# Patient Record
Sex: MALE | Race: WHITE | NOT HISPANIC OR LATINO | Employment: FULL TIME | URBAN - METROPOLITAN AREA
[De-identification: names, ages, dates, MRNs, and addresses within clinical notes are randomized per-mention and may not be internally consistent; named-entity substitution may affect disease eponyms.]

---

## 2017-11-17 ENCOUNTER — TELEPHONE (OUTPATIENT)
Dept: OPTOMETRY | Facility: CLINIC | Age: 37
End: 2017-11-17

## 2018-01-30 ENCOUNTER — OFFICE VISIT (OUTPATIENT)
Dept: OPTOMETRY | Facility: CLINIC | Age: 38
End: 2018-01-30
Payer: COMMERCIAL

## 2018-01-30 DIAGNOSIS — H52.11 MYOPIA OF RIGHT EYE: ICD-10-CM

## 2018-01-30 DIAGNOSIS — Z46.0 ENCOUNTER FOR FITTING OR ADJUSTMENT OF SPECTACLES OR CONTACT LENSES: Primary | ICD-10-CM

## 2018-01-30 DIAGNOSIS — Z01.00 EXAMINATION OF EYES AND VISION: Primary | ICD-10-CM

## 2018-01-30 DIAGNOSIS — H52.202 MYOPIA OF LEFT EYE WITH ASTIGMATISM: ICD-10-CM

## 2018-01-30 DIAGNOSIS — H52.12 MYOPIA OF LEFT EYE WITH ASTIGMATISM: ICD-10-CM

## 2018-01-30 PROCEDURE — 99999 PR PBB SHADOW E&M-EST. PATIENT-LVL II: CPT | Mod: PBBFAC,,, | Performed by: OPTOMETRIST

## 2018-01-30 PROCEDURE — 92310 CONTACT LENS FITTING OU: CPT | Mod: S$GLB,,, | Performed by: OPTOMETRIST

## 2018-01-30 PROCEDURE — 99499 UNLISTED E&M SERVICE: CPT | Mod: S$GLB,,, | Performed by: OPTOMETRIST

## 2018-01-30 PROCEDURE — 92015 DETERMINE REFRACTIVE STATE: CPT | Mod: S$GLB,,, | Performed by: OPTOMETRIST

## 2018-01-30 PROCEDURE — 92014 COMPRE OPH EXAM EST PT 1/>: CPT | Mod: S$GLB,,, | Performed by: OPTOMETRIST

## 2018-01-30 NOTE — PATIENT INSTRUCTIONS
Good ocular health in both eyes.   Myopia in the right eye and myopia with astigmatism in the left eye.  Good best-corrected VA in each eye.  New spectacle lens Rx issued for use in lieu of CLs.    Good CL fit with present SCLs (spherical lens in the right eye, and toric lens in the left eye).  Wearing CLs well in both eyes.  CL power fine as is in each lens.    New (duplicate) CL Rx issued.    Recheck in 12 months - or prior if any problems noted in the interim.

## 2018-01-30 NOTE — PROGRESS NOTES
HPI     eye exam    Additional comments: General eye exam and refraction, and CL follow up.  Reports no problems with CLs.  Good comfort and good VA in each eye.            Comments   Patient's age: 37 y.o. WM  Approximate date of last eye examination:  02/25/2016  Name of last eye doctor seen: Dr. Ac  Wears glasses? Yes - uses only when without CLs     If yes, wears  Full-time or part-time?  Part-time  Present glasses are: Bifocal, SV Distance, SV Reading?  Single vision   Approximate age of present glasses:  Two years   Got new glasses following last exam, or subsequently?:  States yes   Any problem with VA with glasses?  no  Wears CLs?:  yes           If yes:              Type of CL worn:  OD  Acuvue Oasys   8.4   14.0   -4.50                                            OS Biofinity Toric      8.7     14.5   -4.50 -0.75 x 140              Wears full-time or part-time:  Full-time daily wear               Sleeps with contact lenses:  no                             How often replace CLs:  monthly              Any problem with VA with CLs?  No               Headaches?  no  Eye pain/discomfort?  no                                                                                     Flashes?  no  Floaters?  No   Diplopia/Double vision?  no  Patient's Ocular History:         Any eye surgeries? no         Any eye injury?  no         Any treatment for eye disease?  no  Family history of eye disease?  no  Significant patient medical history:         1. Diabetes?  no       If yes, IDDM or NIDDM? n/a   2. HBP?  no              3. Other (describe):  no   ! OTC eyedrops currently using:  no   ! Prescription eye meds currently using:  no   ! Any history of allergy/adverse reaction to any eye meds used   previously?  no    ! Any history of allergy/adverse reaction to eyedrops used during prior   eye exam(s)? no       ! Any history of allergy/adverse reaction to Epinephrine or similar meds?   no    ! Patient okay with use of  anesthetic eyedrops to check eye pressure?    yes        ! Patient okay with use of eyedrops to dilate pupils today?  yes   !  Allergies/Medications/Medical History/Family History reviewed today?    yes      PD =   63/59                                                                       Last edited by Amos Ac, OD on 1/30/2018  1:16 PM. (History)            Assessment /Plan     For exam results, see Encounter Report.    1. Examination of eyes and vision     2. Myopia of right eye     3. Myopia of left eye with astigmatism                    Good ocular health in both eyes.   Myopia in the right eye and myopia with astigmatism in the left eye.  Good best-corrected VA in each eye.  New spectacle lens Rx issued for use in lieu of CLs.    Good CL fit with present SCLs (spherical lens in the right eye, and toric lens in the left eye).  Wearing CLs well in both eyes.  CL power fine as is in each lens.    New (duplicate) CL Rx issued.    Recheck in 12 months - or prior if any problems noted in the interim.

## 2018-01-30 NOTE — PROGRESS NOTES
HPI     Patient in today for contact lens follow-up with general eye examination.    Refer to additional patient encounter notes dated 01/30/3018.      Last edited by Amos Ac, OD on 1/30/2018 12:51 PM. (History)            Assessment /Plan     For exam results, see Encounter Report.    1. Encounter for fitting or adjustment of spectacles or contact lenses                    Good ocular health in both eyes.   Myopia in the right eye and myopia with astigmatism in the left eye.  Good best-corrected VA in each eye.  New spectacle lens Rx issued for use in lieu of CLs.    Good CL fit with present SCLs (spherical lens in the right eye, and toric lens in the left eye).  Wearing CLs well in both eyes.  CL power fine as is in each lens.    New (duplicate) CL Rx issued.    Recheck in 12 months - or prior if any problems noted in the interim.

## 2019-10-07 ENCOUNTER — OFFICE VISIT (OUTPATIENT)
Dept: OPTOMETRY | Facility: CLINIC | Age: 39
End: 2019-10-07
Payer: COMMERCIAL

## 2019-10-07 ENCOUNTER — OFFICE VISIT (OUTPATIENT)
Dept: OPTOMETRY | Facility: CLINIC | Age: 39
End: 2019-10-07

## 2019-10-07 DIAGNOSIS — Z01.00 EXAMINATION OF EYES AND VISION: Primary | ICD-10-CM

## 2019-10-07 DIAGNOSIS — H52.12 MYOPIA OF LEFT EYE WITH ASTIGMATISM: ICD-10-CM

## 2019-10-07 DIAGNOSIS — Z46.0 ENCOUNTER FOR FITTING OR ADJUSTMENT OF SPECTACLES OR CONTACT LENSES: Primary | ICD-10-CM

## 2019-10-07 DIAGNOSIS — H52.11 MYOPIA OF RIGHT EYE: ICD-10-CM

## 2019-10-07 DIAGNOSIS — H52.202 MYOPIA OF LEFT EYE WITH ASTIGMATISM: ICD-10-CM

## 2019-10-07 PROCEDURE — 99499 UNLISTED E&M SERVICE: CPT | Mod: S$GLB,,, | Performed by: OPTOMETRIST

## 2019-10-07 PROCEDURE — 92014 COMPRE OPH EXAM EST PT 1/>: CPT | Mod: S$GLB,,, | Performed by: OPTOMETRIST

## 2019-10-07 PROCEDURE — 92310 CONTACT LENS FITTING OU: CPT | Mod: CSM,,, | Performed by: OPTOMETRIST

## 2019-10-07 PROCEDURE — 92015 PR REFRACTION: ICD-10-PCS | Mod: S$GLB,,, | Performed by: OPTOMETRIST

## 2019-10-07 PROCEDURE — 99999 PR PBB SHADOW E&M-EST. PATIENT-LVL II: CPT | Mod: PBBFAC,,, | Performed by: OPTOMETRIST

## 2019-10-07 PROCEDURE — 92014 PR EYE EXAM, EST PATIENT,COMPREHESV: ICD-10-PCS | Mod: S$GLB,,, | Performed by: OPTOMETRIST

## 2019-10-07 PROCEDURE — 92015 DETERMINE REFRACTIVE STATE: CPT | Mod: S$GLB,,, | Performed by: OPTOMETRIST

## 2019-10-07 PROCEDURE — 99999 PR PBB SHADOW E&M-EST. PATIENT-LVL II: ICD-10-PCS | Mod: PBBFAC,,, | Performed by: OPTOMETRIST

## 2019-10-07 PROCEDURE — 99499 NO LOS: ICD-10-PCS | Mod: S$GLB,,, | Performed by: OPTOMETRIST

## 2019-10-07 PROCEDURE — 92310 PR CONTACT LENS FITTING (NO CHANGE): ICD-10-PCS | Mod: CSM,,, | Performed by: OPTOMETRIST

## 2019-10-07 NOTE — PATIENT INSTRUCTIONS
Apparently good ocular health in both eyes, although dilated fundus exam not done today per request.  Mr. Oconnell will return at later date for dilated fundus exam.  Myopia in the right eye and myopia with astigmatism in the left eye.  Stable refraction in each eye.   Good best-corrected VA in each eye.  New spectacle lens Rx issued for use in lieu of CLs.     Good CL fit with present SCLs (spherical lens in the right eye, and toric lens in the left eye).  Wearing CLs well in both eyes.  CL power fine as is in each lens.     New (duplicate) CL Rx issued.     Recheck in 12 months - or prior if any problems noted in the interim.

## 2019-10-07 NOTE — PROGRESS NOTES
HPI     eye exam      Additional comments: Annual general eye exam and refraction and contact   lens follow-up.  No acute ocular/vision problems.    CLs doing well.  Problem with OS about a week ago- eye was red. Stopped CL wear for approx   one wee, and problem resolved.              Comments     Patient's age: 39y.o. 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 Ac   Wears glasses? Yes - uses only when without CLs -did not bring glasses   today      If yes, wears  Full-time or part-time?  Part-time   Present glasses are: Bifocal, SV Distance, SV Reading?  Single vision   Approximate age of present glasses:  three  years   Got new glasses following last exam, or subsequently?:  no   Any problem with VA with glasses?  no   Wears CLs?:  yes            If yes:               Type of CL worn:  OD  Acuvue Oasys   8.4   14.0   -4.50                                             OS Biofinity Toric      8.7     14.5   -4.50 -0.75 x 140               Wears full-time or part-time:  Full-time daily wear                Sleeps with contact lenses:  no                             How often replace CLs:  monthly               Any problem with VA with CLs?  Not currently - see notes above                    Headaches?  yes, periodically - takes ibuprofen - no pattern to headaches.     Eye pain/discomfort?  no                                                                                     Flashes?  no   Floaters?  No   Diplopia/Double vision?  no   Patient's Ocular History:          Any eye surgeries? no          Any eye injury?  no          Any treatment for eye disease?  no   Family history of eye disease?  no   Significant patient medical history:         1. Diabetes?  no      If yes, IDDM or NIDDM? n/a   2. HBP?  no               3. Other (describe):  no    ! OTC eyedrops currently using:  no    ! Prescription eye meds currently using:  no    ! Any  history of allergy/adverse reaction to any eye meds used   previously?  no    ! Any history of allergy/adverse reaction to eyedrops used during prior   eye exam(s)? no        ! Any history of allergy/adverse reaction to Epinephrine or similar meds?   no    ! Patient okay with use of anesthetic eyedrops to check eye pressure?    yes        ! Patient okay with use of eyedrops to dilate pupils today?  yes    !  Allergies/Medications/Medical History/Family History reviewed today?    yes       PD =   63/59                               Last edited by Amos Ac, OD on 10/7/2019  7:36 AM. (History)            Assessment /Plan     For exam results, see Encounter Report.    1. Examination of eyes and vision     2. Myopia of right eye     3. Myopia of left eye with astigmatism                      Apparently good ocular health in both eyes, although dilated fundus exam not done today per request.  Mr. Oconnell will return at later date for dilated fundus exam.  Myopia in the right eye and myopia with astigmatism in the left eye.  Stable refraction in each eye.   Good best-corrected VA in each eye.  New spectacle lens Rx issued for use in lieu of CLs.     Good CL fit with present SCLs (spherical lens in the right eye, and toric lens in the left eye).  Wearing CLs well in both eyes.  CL power fine as is in each lens.     New (duplicate) CL Rx issued.     Recheck in 12 months - or prior if any problems noted in the interim.

## 2019-10-07 NOTE — PROGRESS NOTES
HPI     Contact Lens Follow Up      Additional comments: Patient in today for contact lens follow-up with   general eye examination.  Refer to additional patient encounter notes   dated 10/07/2019.                Comments     Patient in today for contact lens follow-up with general eye examination.    Refer to additional patient encounter notes dated 10/07/2019.            Last edited by Amos Ac, OD on 10/7/2019 11:46 AM. (History)            Assessment /Plan     For exam results, see Encounter Report.    1. Encounter for fitting or adjustment of spectacles or contact lenses                    Apparently good ocular health in both eyes, although dilated fundus exam not done today per request.  Mr. Oconnell will return at later date for dilated fundus exam.  Myopia in the right eye and myopia with astigmatism in the left eye.  Stable refraction in each eye.   Good best-corrected VA in each eye.  New spectacle lens Rx issued for use in lieu of CLs.     Good CL fit with present SCLs (spherical lens in the right eye, and toric lens in the left eye).  Wearing CLs well in both eyes.  CL power fine as is in each lens.     New (duplicate) CL Rx issued.     Recheck in 12 months - or prior if any problems noted in the interim.

## 2020-04-21 DIAGNOSIS — Z01.84 ANTIBODY RESPONSE EXAMINATION: ICD-10-CM

## 2020-04-22 ENCOUNTER — LAB VISIT (OUTPATIENT)
Dept: LAB | Facility: HOSPITAL | Age: 40
End: 2020-04-22
Attending: INTERNAL MEDICINE
Payer: COMMERCIAL

## 2020-04-22 DIAGNOSIS — Z01.84 ANTIBODY RESPONSE EXAMINATION: ICD-10-CM

## 2020-04-22 LAB — SARS-COV-2 IGG SERPL QL IA: NEGATIVE

## 2020-04-22 PROCEDURE — 86769 SARS-COV-2 COVID-19 ANTIBODY: CPT

## 2020-04-22 PROCEDURE — 36415 COLL VENOUS BLD VENIPUNCTURE: CPT

## 2020-05-07 DIAGNOSIS — M54.9 BACK PAIN, UNSPECIFIED BACK LOCATION, UNSPECIFIED BACK PAIN LATERALITY, UNSPECIFIED CHRONICITY: Primary | ICD-10-CM

## 2020-05-07 RX ORDER — METHYLPREDNISOLONE 4 MG/1
TABLET ORAL
Qty: 1 PACKAGE | Refills: 0 | Status: SHIPPED | OUTPATIENT
Start: 2020-05-07 | End: 2020-05-28

## 2020-05-28 ENCOUNTER — TELEPHONE (OUTPATIENT)
Dept: DERMATOLOGY | Facility: CLINIC | Age: 40
End: 2020-05-28

## 2020-05-28 NOTE — TELEPHONE ENCOUNTER
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 He works at [employer].

## 2020-06-04 ENCOUNTER — OFFICE VISIT (OUTPATIENT)
Dept: DERMATOLOGY | Facility: CLINIC | Age: 40
End: 2020-06-04
Payer: COMMERCIAL

## 2020-06-04 DIAGNOSIS — B36.0 PITYRIASIS VERSICOLOR: Primary | ICD-10-CM

## 2020-06-04 PROCEDURE — 99202 PR OFFICE/OUTPT VISIT, NEW, LEVL II, 15-29 MIN: ICD-10-PCS | Mod: 95,,, | Performed by: DERMATOLOGY

## 2020-06-04 PROCEDURE — 99202 OFFICE O/P NEW SF 15 MIN: CPT | Mod: 95,,, | Performed by: DERMATOLOGY

## 2020-06-04 RX ORDER — KETOCONAZOLE 20 MG/ML
SHAMPOO, SUSPENSION TOPICAL
Qty: 120 ML | Refills: 5 | Status: SHIPPED | OUTPATIENT
Start: 2020-06-04 | End: 2023-06-09

## 2020-06-04 RX ORDER — FLUCONAZOLE 200 MG/1
TABLET ORAL
Qty: 4 TABLET | Refills: 3 | Status: SHIPPED | OUTPATIENT
Start: 2020-06-04 | End: 2023-06-09

## 2020-06-04 NOTE — PROGRESS NOTES
Subjective:       Patient ID:  Nick Oconnell is a 39 y.o. male who presents for   Chief Complaint   Patient presents with    Spot     upper chest             Rash  - Initial  Affected locations: right shoulder, right arm, abdomen, chest and back  Duration: 1 month  Signs / symptoms: itching  Severity: mild to moderate  Aggravated by: heat and sweating  Relieving factors/Treatments tried: OTC antifungals  Improvement on treatment: no relief        Review of Systems   Skin: Positive for itching and rash.        Objective:    Physical Exam   Constitutional: He appears well-developed and well-nourished. No distress.   Eyes: Lids are normal.  No conjunctival no injection.   Neurological: He is oriented to person, place, and time. He is not disoriented.   Psychiatric: He has a normal mood and affect.   Skin:   Areas Examined (abnormalities noted in diagram):   Scalp / Hair Palpated and Inspected  Head / Face Inspection Performed  Chest / Axilla Inspection Performed  Back Inspection Performed  RUE Inspected  LUE Inspection Performed              Diagram Legend     Erythematous scaling macule/papule c/w actinic keratosis       Vascular papule c/w angioma      Pigmented verrucoid papule/plaque c/w seborrheic keratosis      Yellow umbilicated papule c/w sebaceous hyperplasia      Irregularly shaped tan macule c/w lentigo     1-2 mm smooth white papules consistent with Milia      Movable subcutaneous cyst with punctum c/w epidermal inclusion cyst      Subcutaneous movable cyst c/w pilar cyst      Firm pink to brown papule c/w dermatofibroma      Pedunculated fleshy papule(s) c/w skin tag(s)      Evenly pigmented macule c/w junctional nevus     Mildly variegated pigmented, slightly irregular-bordered macule c/w mildly atypical nevus      Flesh colored to evenly pigmented papule c/w intradermal nevus       Pink pearly papule/plaque c/w basal cell carcinoma      Erythematous hyperkeratotic cursted plaque c/w SCC       Surgical scar with no sign of skin cancer recurrence      Open and closed comedones      Inflammatory papules and pustules      Verrucoid papule consistent consistent with wart     Erythematous eczematous patches and plaques     Dystrophic onycholytic nail with subungual debris c/w onychomycosis     Umbilicated papule    Erythematous-base heme-crusted tan verrucoid plaque consistent with inflamed seborrheic keratosis     Erythematous Silvery Scaling Plaque c/w Psoriasis     See annotation                  Assessment / Plan:        Pityriasis versicolor  -     fluconazole (DIFLUCAN) 200 MG Tab; 1 po biw x 2 weeks when flaring  Dispense: 4 tablet; Refill: 3  -     ketoconazole (NIZORAL) 2 % shampoo; Wash hair with medicated shampoo at least 2x/week - let sit on scalp at least 5 minutes prior to rinsing  Dispense: 120 mL; Refill: 5    Use ketoconazole shampoo as body wash x 1 month, then continue to use at least twice weekly let sit 5 minutes then rinse  Okay to get refills for 1 year   Patient to message me in one month if not clearing will schedule sooner follow up visit  Info given to patient in AVS    F/u 1 year    The patient location is: Home in [place]  The chief complaint leading to consultation is: Tinea Veriscolor    Visit type: audiovisual    Face to Face time with patient: 10  15 minutes of total time spent on the encounter, which includes face to face time and non-face to face time preparing to see the patient (eg, review of tests), Obtaining and/or reviewing separately obtained history, Documenting clinical information in the electronic or other health record, Independently interpreting results (not separately reported) and communicating results to the patient/family/caregiver, or Care coordination (not separately reported).         Each patient to whom he or she provides medical services by telemedicine is:  (1) informed of the relationship between the physician and patient and the respective role of  any other health care provider with respect to management of the patient; and (2) notified that he or she may decline to receive medical services by telemedicine and may withdraw from such care at any time.           No follow-ups on file.

## 2020-06-04 NOTE — PATIENT INSTRUCTIONS
Use ketoconazole shampoo as body wash x 1 month, then continue to use at least twice weekly let sit 5 minutes then rinse  Okay to get refills for 1 year   Patient to message me in one month if not clearing will schedule sooner follow up visit    PITYRIASIS VERSICOLOR Patient information    What are the aims of this leaflet?  This leaflet has been written to help you understand more about pityriasis versicolor.  It tells you what it is, what causes it, what can be done about it, and where you can  find out more about it.    What is pityriasis versicolor?  Pityriasis means a type of fine skin scaling, and versicolor means changing colour. It  is a common and harmless rash due to the overgrowth of yeasts that live on everyones  skin. These yeasts, called Malassezia, are not related to yeast in food or to those that  cause thrush. It is also sometimes called tinea versicolor.    What causes it?  Large numbers of tiny harmless organisms, known as the resident geronimo, live on the  surface of everyones skin. Some of them are yeasts. At times, these yeasts can  overgrow and trigger the rash known as pityriasis versicolor. This happens most often  in warm moist climates. Most people with this condition are in good health.  The rash has a mixture of colours- hence the name versicolor. It looks pale brown or  pink on untanned, white skin, but sunlight will cause the yeasts to make chemicals that  prevent tanning, so the rash stays paler than the surrounding skin. For this reason, the  rash is often noticed for the first time after exposure to the sun such as following a  ursula holiday.  The condition is most common in people in their early 20s. People who get it may also  have dandruff which is caused by a similar yeast overgrowth on the scalp. We do not  understand why some people tend to get it and others do not.    Is it hereditary?  No.    What are its symptoms?  The rash can be mildly itchy but usually causes no  trouble apart from its appearance.  It often goes unnoticed if only a few patches are present.    What does it look like?  The rash usually affects the torso but can also affect the upper arms, neck and  stomach. Flat, slightly scaly areas of altered colour appear more obvious against a  background of unaffected skin. The colour of the patches may vary from pale to dark  brown. On white skin the affected areas usually appear darker than surrounding skin  and in dark skinned people the affected patches can appear pale.    \How will it be diagnosed?  The diagnosis is usually made by your doctor looking at the rash. A special ultra-violet  lamp, known as Woods lamp, can be used to look for yellow fluorescence which is  typical of pityriasis versicolor. Sometimes your doctor may take skin scrapings to  confirm the diagnosis. If there is any doubt, very occasionally a skin (biopsy) [this is  when a small sample of skin is cut out to look at under the microscope] may be  considered.    Can it be cured?  Yes, the rash clears with treatment although the pale areas will take several months  to return to their normal colour. Importantly, this does not mean treatment has failed.  The rash often recurs as the yeasts that cause it to live on normal skin and cannot be  eradicated completely. Pityriasis versicolor does not leave scars.    How can it be treated?  Treatments applied to the skin:  Most patients are treated with topical antifungal agents that are applied to the skin.  Treatments that reduce the amount of skin yeasts include terbinafine cream,  clotrimazole cream and miconazole cream. Ketoconazole and selenium sulphide  shampoos can be used as body washes but should not be left on the skin.  Medicines taken by mouth:  These include itraconazole and fluconazole, which can be prescribed by your doctor.  They are effective but can have side effects so are usually prescribed for widespread  rashes, or when topical  treatment has failed.    Recurrences:  The rash of pityriasis versicolor often comes back. Occasional use of an anti-dandruff  shampoo as a bodywash may reduce the chance of this happening.       What can I do?  It may help if you wash with an anti-dandruff shampoo for a few weeks before you go  on a ursula holiday to reduce the level of skin yeasts. There is no evidence that the  complaint is related to yeast in food so there is no need to change your diet.  Where can I get more information about it?  Web links to detailed leaflets:  http://www.dermnetnz.org/fungal/pityriasis-versicolor.html  http://www.patient.co.uk/doctor/pityriasis-versicolor  For details of source materials used please contact the Clinical Standards Unit  (clinicalstandards@bad.org.uk).  This leaflet aims to provide accurate information about the subject and is a  consensus of the views held by representatives of the [employer]  [employer]: individual patient circumstances may differ, which might alter  both the advice and course of therapy given to you by your doctor.  This leaflet has been assessed for readability by the [employer]  [employer] Patient Information Lay Review Panel  [place] [employer]  PATIENT INFORMATION LEAFLET  PRODUCED MAY 2008  UPDATED AUGUST 2011, AUGUST 2014, NOVEMBER 2017  REVIEW DATE NOVEMBER 2020

## 2020-10-02 ENCOUNTER — CLINICAL SUPPORT (OUTPATIENT)
Dept: OTHER | Facility: CLINIC | Age: 40
End: 2020-10-02
Payer: COMMERCIAL

## 2020-10-02 DIAGNOSIS — Z00.8 ENCOUNTER FOR OTHER GENERAL EXAMINATION: ICD-10-CM

## 2020-10-06 VITALS — BODY MASS INDEX: 27.05 KG/M2 | HEIGHT: 73 IN

## 2020-10-06 LAB
GLUCOSE SERPL-MCNC: 95 MG/DL (ref 60–140)
HDLC SERPL-MCNC: 41 MG/DL
POC CHOLESTEROL, LDL (DOCK): 119 MG/DL
POC CHOLESTEROL, TOTAL: 186 MG/DL
TRIGL SERPL-MCNC: 131 MG/DL

## 2020-10-07 ENCOUNTER — OFFICE VISIT (OUTPATIENT)
Dept: OPTOMETRY | Facility: CLINIC | Age: 40
End: 2020-10-07
Payer: COMMERCIAL

## 2020-10-07 DIAGNOSIS — Z46.0 ENCOUNTER FOR FITTING OR ADJUSTMENT OF SPECTACLES OR CONTACT LENSES: Primary | ICD-10-CM

## 2020-10-07 DIAGNOSIS — H52.11 MYOPIA OF RIGHT EYE: ICD-10-CM

## 2020-10-07 DIAGNOSIS — H52.202 MYOPIA OF LEFT EYE WITH ASTIGMATISM: ICD-10-CM

## 2020-10-07 DIAGNOSIS — H52.12 MYOPIA OF LEFT EYE WITH ASTIGMATISM: ICD-10-CM

## 2020-10-07 DIAGNOSIS — Z01.00 EXAMINATION OF EYES AND VISION: Primary | ICD-10-CM

## 2020-10-07 PROCEDURE — 92012 INTRM OPH EXAM EST PATIENT: CPT | Mod: S$GLB,,, | Performed by: OPTOMETRIST

## 2020-10-07 PROCEDURE — 99499 NO LOS: ICD-10-PCS | Mod: S$GLB,,, | Performed by: OPTOMETRIST

## 2020-10-07 PROCEDURE — 92310 CONTACT LENS FITTING OU: CPT | Mod: CSM,S$GLB,, | Performed by: OPTOMETRIST

## 2020-10-07 PROCEDURE — 92310 PR CONTACT LENS FITTING (NO CHANGE): ICD-10-PCS | Mod: CSM,S$GLB,, | Performed by: OPTOMETRIST

## 2020-10-07 PROCEDURE — 99499 UNLISTED E&M SERVICE: CPT | Mod: S$GLB,,, | Performed by: OPTOMETRIST

## 2020-10-07 PROCEDURE — 92015 DETERMINE REFRACTIVE STATE: CPT | Mod: S$GLB,,, | Performed by: OPTOMETRIST

## 2020-10-07 PROCEDURE — 99999 PR PBB SHADOW E&M-EST. PATIENT-LVL III: ICD-10-PCS | Mod: PBBFAC,,, | Performed by: OPTOMETRIST

## 2020-10-07 PROCEDURE — 92012 PR EYE EXAM, EST PATIENT,INTERMED: ICD-10-PCS | Mod: S$GLB,,, | Performed by: OPTOMETRIST

## 2020-10-07 PROCEDURE — 99999 PR PBB SHADOW E&M-EST. PATIENT-LVL III: CPT | Mod: PBBFAC,,, | Performed by: OPTOMETRIST

## 2020-10-07 PROCEDURE — 92015 PR REFRACTION: ICD-10-PCS | Mod: S$GLB,,, | Performed by: OPTOMETRIST

## 2020-10-07 NOTE — PATIENT INSTRUCTIONS
Apparently good ocular health in both eyes, although dilated fundus exam not done today per request.  Mr. Oconnell will return at later date for dilated fundus exam.  Myopia in the right eye and myopia with astigmatism in the left eye.    Good best-corrected VA in each eye.  New spectacle lens Rx issued for use in lieu of CLs.     Good CL fit with present SCLs (spherical lens in the right eye, and toric lens in the left eye).  Wearing CLs well in both eyes.  CL power in right lens fine as is, and showint need for only minimal change in the power of the left lens     New CL Rx issued.    As noted above, return when convenient for dilated fundus exam.  Call Ms. Rasheed at [phone] to schedule that no-charge visit      Recheck in 12 months - or prior if any problems noted in the interim.

## 2020-10-07 NOTE — PROGRESS NOTES
HPI     Contact Lens Follow Up      Additional comments: Patient in today for contact lens follow-up with   general eye examination.  Refer to additional patient encounter notes   dated 10/07/2020.                Comments     Patient in today for contact lens follow-up with general eye examination.    Refer to additional patient encounter notes dated 10/07/2020.            Last edited by Amos Ac, OD on 10/7/2020  8:18 AM. (History)            Assessment /Plan     For exam results, see Encounter Report.    1. Encounter for fitting or adjustment of spectacles or contact lenses                    Apparently good ocular health in both eyes, although dilated fundus exam not done today per request.  Mr. Oconnell will return at later date for dilated fundus exam.  Myopia in the right eye and myopia with astigmatism in the left eye.    Good best-corrected VA in each eye.  New spectacle lens Rx issued for use in lieu of CLs.     Good CL fit with present SCLs (spherical lens in the right eye, and toric lens in the left eye).  Wearing CLs well in both eyes.  CL power in right lens fine as is, and showint need for only minimal change in the power of the left lens     New CL Rx issued.    As noted above, return when convenient for dilated fundus exam.  Call Ms. Rasheed at [phone] to schedule that no-charge visit      Recheck in 12 months - or prior if any problems noted in the interim.

## 2020-10-07 NOTE — PROGRESS NOTES
HPI     eye examination       Additional comments: General eye exam and refraction and contact lens   follow-up.  No acute ocular/vision problems.  No problems with CLs.  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               Comments     Patient's age: 40 y.o.    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 Ac   Wears glasses? Yes - uses only when without CLs      If yes, wears  Full-time or part-time?  Part-time   Present glasses are: Bifocal, SV Distance, SV Reading?  Single vision   Approximate age of present glasses:  one or two years old  Got new glasses following last exam, or subsequently?:  unsure.   Any problem with VA with glasses?  no   Wears CLs?:  yes            If yes:               Type of CL worn:  OD  Acuvue Oasys   8.4   14.0   -4.50 sph                                            OS Biofinity Toric      8.7     14.5   -4.50 -0.75 x 140               Wears full-time or part-time:  Full-time daily wear                Sleeps with contact lenses:  no                             How often replace CLs:  R lens every two weeks, L lens monthly                 Any problem with VA with CLs?  No                  Headaches?  no - not since he started Claritin and Flonase    Eye pain/discomfort?  no                                                                                     Flashes?  no   Floaters?  No   Diplopia/Double vision?  no   Patient's Ocular History:          Any eye surgeries? no          Any eye injury?  no          Any treatment for eye disease?  no   Family history of eye disease?  no   Significant patient medical history:         1. Diabetes?  no      If yes, IDDM or NIDDM? n/a             2. HBP?  no                 ! OTC eyedrops currently using:  no    ! Prescription eye meds currently using:  no    ! Any history of allergy/adverse reaction to any eye meds used   previously?   no    ! Any history of allergy/adverse reaction to eyedrops used during prior   eye exam(s)? no        ! Any history of allergy/adverse reaction to Epinephrine or similar meds?   no    ! Patient okay with use of anesthetic eyedrops to check eye pressure?    yes        ! Patient okay with use of eyedrops to dilate pupils today?  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  Allergies/Medications/Medical History/Family History reviewed today?    yes     PD =   63/59           Last edited by Amos Ac, OD on 10/7/2020  7:37 AM. (History)            Assessment /Plan     For exam results, see Encounter Report.    1. Examination of eyes and vision     2. Myopia of right eye     3. Myopia of left eye with astigmatism                    Apparently good ocular health in both eyes, although dilated fundus exam not done today per request.  Mr. Oconnell will return at later date for dilated fundus exam.  Myopia in the right eye and myopia with astigmatism in the left eye.    Good best-corrected VA in each eye.  New spectacle lens Rx issued for use in lieu of CLs.     Good CL fit with present SCLs (spherical lens in the right eye, and toric lens in the left eye).  Wearing CLs well in both eyes.  CL power in right lens fine as is, and showint need for only minimal change in the power of the left lens     New CL Rx issued.    As noted above, return when convenient for dilated fundus exam.  Call Ms. Rasheed at [phone] to schedule that no-charge visit      Recheck in 12 months - or prior if any problems noted in the interim.

## 2020-10-07 NOTE — PATIENT INSTRUCTIONS
Apparently good ocular health in both eyes, although dilated fundus exam not done today per request.  Mr. Oconnell will return at later date for dilated fundus exam.  Myopia in the right eye and myopia with astigmatism in the left eye.    Good best-corrected VA in each eye.  New spectacle lens Rx issued for use in lieu of CLs.     Good CL fit with present SCLs (spherical lens in the right eye, and toric lens in the left eye).  Wearing CLs well in both eyes.  CL power in right lens fine as is, and showint need for only minimal change in the power of the left lens     New CL Rx issued.    As noted above, return when convenient for dilated fundus exam.  Call Ms. Rasheed at [phone] to schedule that no-charge visit      Recheck in 12 months - or prior if any problems noted in the interim.

## 2020-12-17 ENCOUNTER — IMMUNIZATION (OUTPATIENT)
Dept: INTERNAL MEDICINE | Facility: CLINIC | Age: 40
End: 2020-12-17
Payer: COMMERCIAL

## 2020-12-17 DIAGNOSIS — Z23 NEED FOR VACCINATION: ICD-10-CM

## 2020-12-17 PROCEDURE — 91300 COVID-19, MRNA, LNP-S, PF, 30 MCG/0.3 ML DOSE VACCINE: CPT | Mod: ,,, | Performed by: INTERNAL MEDICINE

## 2020-12-17 PROCEDURE — 0001A COVID-19, MRNA, LNP-S, PF, 30 MCG/0.3 ML DOSE VACCINE: CPT | Mod: CV19,,, | Performed by: INTERNAL MEDICINE

## 2020-12-17 PROCEDURE — 0001A COVID-19, MRNA, LNP-S, PF, 30 MCG/0.3 ML DOSE VACCINE: ICD-10-PCS | Mod: CV19,,, | Performed by: INTERNAL MEDICINE

## 2020-12-17 PROCEDURE — 91300 COVID-19, MRNA, LNP-S, PF, 30 MCG/0.3 ML DOSE VACCINE: ICD-10-PCS | Mod: ,,, | Performed by: INTERNAL MEDICINE

## 2021-01-07 ENCOUNTER — IMMUNIZATION (OUTPATIENT)
Dept: INTERNAL MEDICINE | Facility: CLINIC | Age: 41
End: 2021-01-07
Payer: COMMERCIAL

## 2021-01-07 DIAGNOSIS — Z23 NEED FOR VACCINATION: ICD-10-CM

## 2021-01-07 PROCEDURE — 91300 COVID-19, MRNA, LNP-S, PF, 30 MCG/0.3 ML DOSE VACCINE: CPT | Mod: PBBFAC | Performed by: INTERNAL MEDICINE

## 2021-01-07 PROCEDURE — 0002A COVID-19, MRNA, LNP-S, PF, 30 MCG/0.3 ML DOSE VACCINE: CPT | Mod: PBBFAC | Performed by: INTERNAL MEDICINE

## 2021-04-09 ENCOUNTER — OFFICE VISIT (OUTPATIENT)
Dept: OTOLARYNGOLOGY | Facility: CLINIC | Age: 41
End: 2021-04-09
Payer: COMMERCIAL

## 2021-04-09 VITALS
DIASTOLIC BLOOD PRESSURE: 82 MMHG | BODY MASS INDEX: 27.05 KG/M2 | SYSTOLIC BLOOD PRESSURE: 149 MMHG | WEIGHT: 205 LBS | HEART RATE: 69 BPM

## 2021-04-09 DIAGNOSIS — Z96.22 HISTORY OF TYMPANOSTOMY TUBE PLACEMENT: ICD-10-CM

## 2021-04-09 DIAGNOSIS — H69.93 DYSFUNCTION OF BOTH EUSTACHIAN TUBES: Primary | ICD-10-CM

## 2021-04-09 PROCEDURE — 99999 PR PBB SHADOW E&M-EST. PATIENT-LVL III: ICD-10-PCS | Mod: PBBFAC,,, | Performed by: OTOLARYNGOLOGY

## 2021-04-09 PROCEDURE — 3008F PR BODY MASS INDEX (BMI) DOCUMENTED: ICD-10-PCS | Mod: CPTII,S$GLB,, | Performed by: OTOLARYNGOLOGY

## 2021-04-09 PROCEDURE — 99203 PR OFFICE/OUTPT VISIT, NEW, LEVL III, 30-44 MIN: ICD-10-PCS | Mod: S$GLB,,, | Performed by: OTOLARYNGOLOGY

## 2021-04-09 PROCEDURE — 3008F BODY MASS INDEX DOCD: CPT | Mod: CPTII,S$GLB,, | Performed by: OTOLARYNGOLOGY

## 2021-04-09 PROCEDURE — 99999 PR PBB SHADOW E&M-EST. PATIENT-LVL III: CPT | Mod: PBBFAC,,, | Performed by: OTOLARYNGOLOGY

## 2021-04-09 PROCEDURE — 99203 OFFICE O/P NEW LOW 30 MIN: CPT | Mod: S$GLB,,, | Performed by: OTOLARYNGOLOGY

## 2021-04-09 RX ORDER — FLUTICASONE PROPIONATE 50 MCG
1 SPRAY, SUSPENSION (ML) NASAL DAILY
COMMUNITY

## 2021-04-09 RX ORDER — LORATADINE 10 MG/1
10 TABLET ORAL DAILY
COMMUNITY

## 2021-11-10 ENCOUNTER — TELEPHONE (OUTPATIENT)
Dept: OPHTHALMOLOGY | Facility: CLINIC | Age: 41
End: 2021-11-10
Payer: COMMERCIAL

## 2021-11-15 ENCOUNTER — TELEPHONE (OUTPATIENT)
Dept: OPHTHALMOLOGY | Facility: CLINIC | Age: 41
End: 2021-11-15
Payer: COMMERCIAL

## 2021-12-01 ENCOUNTER — OFFICE VISIT (OUTPATIENT)
Dept: OPTOMETRY | Facility: CLINIC | Age: 41
End: 2021-12-01
Payer: COMMERCIAL

## 2021-12-01 DIAGNOSIS — Z01.00 EXAMINATION OF EYES AND VISION: Primary | ICD-10-CM

## 2021-12-01 DIAGNOSIS — Z46.0 ENCOUNTER FOR FITTING OR ADJUSTMENT OF SPECTACLES OR CONTACT LENSES: Primary | ICD-10-CM

## 2021-12-01 DIAGNOSIS — Z13.5 SCREENING FOR EYE CONDITION: ICD-10-CM

## 2021-12-01 DIAGNOSIS — H52.203 MYOPIA OF BOTH EYES WITH ASTIGMATISM: ICD-10-CM

## 2021-12-01 DIAGNOSIS — H52.13 MYOPIA OF BOTH EYES WITH ASTIGMATISM: ICD-10-CM

## 2021-12-01 PROCEDURE — 92310 PR CONTACT LENS FITTING (NO CHANGE): ICD-10-PCS | Mod: ,,, | Performed by: OPTOMETRIST

## 2021-12-01 PROCEDURE — 99999 PR PBB SHADOW E&M-EST. PATIENT-LVL II: CPT | Mod: PBBFAC,,, | Performed by: OPTOMETRIST

## 2021-12-01 PROCEDURE — 92310 CONTACT LENS FITTING OU: CPT | Mod: ,,, | Performed by: OPTOMETRIST

## 2021-12-01 PROCEDURE — 92015 PR REFRACTION: ICD-10-PCS | Mod: S$GLB,,, | Performed by: OPTOMETRIST

## 2021-12-01 PROCEDURE — 99499 UNLISTED E&M SERVICE: CPT | Mod: ,,, | Performed by: OPTOMETRIST

## 2021-12-01 PROCEDURE — 92014 PR EYE EXAM, EST PATIENT,COMPREHESV: ICD-10-PCS | Mod: S$GLB,,, | Performed by: OPTOMETRIST

## 2021-12-01 PROCEDURE — 92015 DETERMINE REFRACTIVE STATE: CPT | Mod: S$GLB,,, | Performed by: OPTOMETRIST

## 2021-12-01 PROCEDURE — 99499 NO LOS: ICD-10-PCS | Mod: ,,, | Performed by: OPTOMETRIST

## 2021-12-01 PROCEDURE — 99999 PR PBB SHADOW E&M-EST. PATIENT-LVL II: ICD-10-PCS | Mod: PBBFAC,,, | Performed by: OPTOMETRIST

## 2021-12-01 PROCEDURE — 92014 COMPRE OPH EXAM EST PT 1/>: CPT | Mod: S$GLB,,, | Performed by: OPTOMETRIST

## 2022-01-07 ENCOUNTER — DOCUMENTATION ONLY (OUTPATIENT)
Dept: ADMINISTRATIVE | Facility: HOSPITAL | Age: 42
End: 2022-01-07
Payer: COMMERCIAL

## 2022-01-07 LAB
CTP QC/QA: YES
SARS-COV-2 AG RESP QL IA.RAPID: NEGATIVE

## 2022-08-04 ENCOUNTER — PATIENT MESSAGE (OUTPATIENT)
Dept: OPTOMETRY | Facility: CLINIC | Age: 42
End: 2022-08-04
Payer: COMMERCIAL

## 2022-12-07 ENCOUNTER — PATIENT MESSAGE (OUTPATIENT)
Dept: OPTOMETRY | Facility: CLINIC | Age: 42
End: 2022-12-07
Payer: COMMERCIAL

## 2022-12-08 ENCOUNTER — DOCUMENTATION ONLY (OUTPATIENT)
Dept: OPHTHALMOLOGY | Facility: CLINIC | Age: 42
End: 2022-12-08
Payer: COMMERCIAL

## 2022-12-08 ENCOUNTER — TELEPHONE (OUTPATIENT)
Dept: OPHTHALMOLOGY | Facility: CLINIC | Age: 42
End: 2022-12-08
Payer: COMMERCIAL

## 2022-12-08 NOTE — PROGRESS NOTES
Assessment /Plan     For exam results, see Encounter Report.    There are no diagnoses linked to this encounter.  Refractive error OU.  Wears SCLs OU           Contact lens Rx extended to 06/01/2023.    Amos Ac, OD

## 2022-12-19 ENCOUNTER — TELEPHONE (OUTPATIENT)
Dept: OPHTHALMOLOGY | Facility: CLINIC | Age: 42
End: 2022-12-19
Payer: COMMERCIAL

## 2022-12-19 NOTE — TELEPHONE ENCOUNTER
----- Message from Jennifer Chin sent at 12/19/2022  2:35 PM CST -----  This patient picked up his trials today.    Jennifer

## 2022-12-20 ENCOUNTER — TELEPHONE (OUTPATIENT)
Dept: OPHTHALMOLOGY | Facility: CLINIC | Age: 42
End: 2022-12-20
Payer: COMMERCIAL

## 2023-03-22 ENCOUNTER — OFFICE VISIT (OUTPATIENT)
Dept: OPTOMETRY | Facility: CLINIC | Age: 43
End: 2023-03-22
Payer: COMMERCIAL

## 2023-03-22 DIAGNOSIS — Z01.00 EXAMINATION OF EYES AND VISION: Primary | ICD-10-CM

## 2023-03-22 DIAGNOSIS — Z46.0 ENCOUNTER FOR FITTING OR ADJUSTMENT OF SPECTACLES OR CONTACT LENSES: Primary | ICD-10-CM

## 2023-03-22 DIAGNOSIS — H52.203 MYOPIA OF BOTH EYES WITH ASTIGMATISM: ICD-10-CM

## 2023-03-22 DIAGNOSIS — Z13.5 SCREENING FOR EYE CONDITION: ICD-10-CM

## 2023-03-22 DIAGNOSIS — H52.13 MYOPIA OF BOTH EYES WITH ASTIGMATISM: ICD-10-CM

## 2023-03-22 PROCEDURE — 99999 PR PBB SHADOW E&M-EST. PATIENT-LVL III: ICD-10-PCS | Mod: PBBFAC,,, | Performed by: OPTOMETRIST

## 2023-03-22 PROCEDURE — 92015 DETERMINE REFRACTIVE STATE: CPT | Mod: S$GLB,,, | Performed by: OPTOMETRIST

## 2023-03-22 PROCEDURE — 92012 PR EYE EXAM, EST PATIENT,INTERMED: ICD-10-PCS | Mod: S$GLB,,, | Performed by: OPTOMETRIST

## 2023-03-22 PROCEDURE — 99499 UNLISTED E&M SERVICE: CPT | Mod: ,,, | Performed by: OPTOMETRIST

## 2023-03-22 PROCEDURE — 92015 PR REFRACTION: ICD-10-PCS | Mod: S$GLB,,, | Performed by: OPTOMETRIST

## 2023-03-22 PROCEDURE — 99499 NO LOS: ICD-10-PCS | Mod: ,,, | Performed by: OPTOMETRIST

## 2023-03-22 PROCEDURE — 92310 CONTACT LENS FITTING OU: CPT | Mod: CSM,S$GLB,, | Performed by: OPTOMETRIST

## 2023-03-22 PROCEDURE — 92310 PR CONTACT LENS FITTING (NO CHANGE): ICD-10-PCS | Mod: CSM,S$GLB,, | Performed by: OPTOMETRIST

## 2023-03-22 PROCEDURE — 99999 PR PBB SHADOW E&M-EST. PATIENT-LVL III: CPT | Mod: PBBFAC,,, | Performed by: OPTOMETRIST

## 2023-03-22 PROCEDURE — 92012 INTRM OPH EXAM EST PATIENT: CPT | Mod: S$GLB,,, | Performed by: OPTOMETRIST

## 2023-03-22 NOTE — PATIENT INSTRUCTIONS
Apparently good ocular health in both eyes, but dilated fundus exam not done today per request, and tonometry deferred until Mr. Oconnell can return for dilated fundus exam at later date.      Myopia with astigmatism in the each eye, per post-contact lens refraction done today.  Good best-corrected VA in each eye.  New spectacle lens Rx issued for use in lieu of CLs.     Good CL fit with present SCLs (Acuvue spherical lens in the right eye, and Biofinity toric lens in the left eye).  Wearing CLs well in both eyes.  CL power in right lens fine as is, and showing need for only minimal change in the power of the left lens     New CL Rx issued.     Return when convenient for dilated fundus exam and tonometry.   Otherwise, repeat general eye exam and contact lens check in 12 months - or prior if any problems noted in the interim.

## 2023-03-22 NOTE — PROGRESS NOTES
HPI     Contact Lens Follow Up            Comments: Patient in today for contact lens follow-up with general eye   examination.  Refer to additional patient encounter notes dated   03/22/2023.              Comments    Patient in today for contact lens follow-up with general eye examination.    Refer to additional patient encounter notes dated 03/22/2023.              Last edited by Amos Ac, OD on 3/22/2023  1:18 PM.            Assessment /Plan     For exam results, see Encounter Report.    1. Encounter for fitting or adjustment of spectacles or contact lenses                     Apparently good ocular health in both eyes, but dilated fundus exam not done today per request, and tonometry deferred until Mr. Oconnell can return for dilated fundus exam at later date.      Myopia with astigmatism in the each eye, per post-contact lens refraction done today.  Good best-corrected VA in each eye.  New spectacle lens Rx issued for use in lieu of CLs.     Good CL fit with present SCLs (Acuvue spherical lens in the right eye, and Biofinity toric lens in the left eye).  Wearing CLs well in both eyes.  CL power in right lens fine as is, and showing need for only minimal change in the power of the left lens     New CL Rx issued.     Return when convenient for dilated fundus exam and tonometry.   Otherwise, repeat general eye exam and contact lens check in 12 months - or prior if any problems noted in the interim.

## 2023-03-22 NOTE — PROGRESS NOTES
HPI     eye examination             Comments: Eye exam and refraction and contact lens follow-up.  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 Ac   Wears glasses? Yes - uses only when without CLs      If yes, wears  Full-time or part-time?  Part-time   Present glasses are: Bifocal, SV Distance, SV Reading?  Single vision   Approximate age of present glasses: 6 years  Got new glasses following last exam, or subsequently?:  no   Any problem with VA with glasses?  no   Wears CLs?:  yes            If yes:               Type of CL worn:  OD  Acuvue Oasys   8.4   14.0   -4.25 sph                                             OS Biofinity Toric      8.7     14.5   -4.25 -0.75 x 140                       Wears full-time or part-time:  Full-time daily wear                Sleeps with contact lenses:  no                             How often replace CLs:  monthly              Any problem with VA with CLs?  No                 Headaches?  no  Eye pain/discomfort?  no                                                                                     Flashes?  no   Floaters?  No   Diplopia/Double vision?  no   Patient's Ocular History:          Any eye surgeries? no          Any eye injury?  no          Any treatment for eye disease?  no   Family history of eye disease?  no   Significant patient medical history:         1. Diabetes?  no      If yes, IDDM or NIDDM? n/a             2. HBP?  no               3.  Allergy problems    ! OTC eyedrops currently using:  no    ! Prescription eye meds currently using:  no    ! Any history of allergy/adverse reaction to any eye meds used   previously?  no    ! Any history of  "allergy/adverse reaction to eyedrops used during prior   eye exam(s)? no        ! Any history of allergy/adverse reaction to Epinephrine or similar meds?   no     ! Patient okay with use of anesthetic eyedrops to check eye pressure?    yes        ! 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  Allergies/Medications/Medical History/Family History reviewed today?    yes     PD =   63/59   Reading distance = 17"            Last edited by Amos Ac, OD on 3/22/2023  1:35 PM.            Assessment /Plan     For exam results, see Encounter Report.    1. Examination of eyes and vision        2. Myopia of both eyes with astigmatism        3. Screening for eye condition                       Apparently good ocular health in both eyes, but dilated fundus exam not done today per request, and tonometry deferred until Mr. Oconnell can return for dilated fundus exam at later date.      Myopia with astigmatism in the each eye, per post-contact lens refraction done today.  Good best-corrected VA in each eye.  New spectacle lens Rx issued for use in lieu of CLs.     Good CL fit with present SCLs (Acuvue spherical lens in the right eye, and Biofinity toric lens in the left eye).  Wearing CLs well in both eyes.  CL power in right lens fine as is, and showing need for only minimal change in the power of the left lens     New CL Rx issued.     Return when convenient for dilated fundus exam and tonometry.   Otherwise, repeat general eye exam and contact lens check in 12 months - or prior if any problems noted in the interim.          "

## 2023-03-31 ENCOUNTER — PATIENT MESSAGE (OUTPATIENT)
Dept: OPTOMETRY | Facility: CLINIC | Age: 43
End: 2023-03-31
Payer: COMMERCIAL

## 2023-06-09 ENCOUNTER — OFFICE VISIT (OUTPATIENT)
Dept: PRIMARY CARE CLINIC | Facility: CLINIC | Age: 43
End: 2023-06-09
Payer: COMMERCIAL

## 2023-06-09 ENCOUNTER — LAB VISIT (OUTPATIENT)
Dept: LAB | Facility: HOSPITAL | Age: 43
End: 2023-06-09
Payer: COMMERCIAL

## 2023-06-09 VITALS
OXYGEN SATURATION: 99 % | TEMPERATURE: 98 F | HEART RATE: 70 BPM | BODY MASS INDEX: 26.85 KG/M2 | DIASTOLIC BLOOD PRESSURE: 72 MMHG | RESPIRATION RATE: 18 BRPM | HEIGHT: 73 IN | SYSTOLIC BLOOD PRESSURE: 132 MMHG | WEIGHT: 202.63 LBS

## 2023-06-09 DIAGNOSIS — Z00.00 ENCOUNTER FOR PHYSICAL EXAMINATION: Primary | ICD-10-CM

## 2023-06-09 DIAGNOSIS — Z00.00 ENCOUNTER FOR PHYSICAL EXAMINATION: ICD-10-CM

## 2023-06-09 LAB
ALBUMIN SERPL BCP-MCNC: 4.5 G/DL (ref 3.5–5.2)
ALP SERPL-CCNC: 81 U/L (ref 55–135)
ALT SERPL W/O P-5'-P-CCNC: 25 U/L (ref 10–44)
ANION GAP SERPL CALC-SCNC: 6 MMOL/L (ref 8–16)
AST SERPL-CCNC: 29 U/L (ref 10–40)
BILIRUB SERPL-MCNC: 0.4 MG/DL (ref 0.1–1)
BILIRUB UR QL STRIP: NEGATIVE
BUN SERPL-MCNC: 21 MG/DL (ref 6–20)
CALCIUM SERPL-MCNC: 9.8 MG/DL (ref 8.7–10.5)
CHLORIDE SERPL-SCNC: 98 MMOL/L (ref 95–110)
CHOLEST SERPL-MCNC: 173 MG/DL (ref 120–199)
CHOLEST/HDLC SERPL: 3.6 {RATIO} (ref 2–5)
CLARITY UR REFRACT.AUTO: CLEAR
CO2 SERPL-SCNC: 32 MMOL/L (ref 23–29)
COLOR UR AUTO: COLORLESS
COMPLEXED PSA SERPL-MCNC: 0.23 NG/ML (ref 0–4)
CREAT SERPL-MCNC: 1.1 MG/DL (ref 0.5–1.4)
ERYTHROCYTE [DISTWIDTH] IN BLOOD BY AUTOMATED COUNT: 12.1 % (ref 11.5–14.5)
EST. GFR  (NO RACE VARIABLE): >60 ML/MIN/1.73 M^2
GLUCOSE SERPL-MCNC: 127 MG/DL (ref 70–110)
GLUCOSE UR QL STRIP: NEGATIVE
HCT VFR BLD AUTO: 45.3 % (ref 40–54)
HDLC SERPL-MCNC: 48 MG/DL (ref 40–75)
HDLC SERPL: 27.7 % (ref 20–50)
HGB BLD-MCNC: 14.7 G/DL (ref 14–18)
HGB UR QL STRIP: NEGATIVE
KETONES UR QL STRIP: NEGATIVE
LDLC SERPL CALC-MCNC: 100.8 MG/DL (ref 63–159)
LEUKOCYTE ESTERASE UR QL STRIP: NEGATIVE
MAGNESIUM SERPL-MCNC: 1.8 MG/DL (ref 1.6–2.6)
MCH RBC QN AUTO: 29.6 PG (ref 27–31)
MCHC RBC AUTO-ENTMCNC: 32.5 G/DL (ref 32–36)
MCV RBC AUTO: 91 FL (ref 82–98)
NITRITE UR QL STRIP: NEGATIVE
NONHDLC SERPL-MCNC: 125 MG/DL
PH UR STRIP: 6 [PH] (ref 5–8)
PLATELET # BLD AUTO: 232 K/UL (ref 150–450)
PMV BLD AUTO: 11.8 FL (ref 9.2–12.9)
POTASSIUM SERPL-SCNC: 4.5 MMOL/L (ref 3.5–5.1)
PROT SERPL-MCNC: 7.5 G/DL (ref 6–8.4)
PROT UR QL STRIP: NEGATIVE
RBC # BLD AUTO: 4.97 M/UL (ref 4.6–6.2)
SODIUM SERPL-SCNC: 136 MMOL/L (ref 136–145)
SP GR UR STRIP: 1.01 (ref 1–1.03)
TRIGL SERPL-MCNC: 121 MG/DL (ref 30–150)
URN SPEC COLLECT METH UR: ABNORMAL
WBC # BLD AUTO: 6.99 K/UL (ref 3.9–12.7)

## 2023-06-09 PROCEDURE — 85027 COMPLETE CBC AUTOMATED: CPT | Performed by: INTERNAL MEDICINE

## 2023-06-09 PROCEDURE — 80061 LIPID PANEL: CPT | Performed by: INTERNAL MEDICINE

## 2023-06-09 PROCEDURE — 99999 PR PBB SHADOW E&M-EST. PATIENT-LVL IV: CPT | Mod: PBBFAC,,, | Performed by: INTERNAL MEDICINE

## 2023-06-09 PROCEDURE — 36415 COLL VENOUS BLD VENIPUNCTURE: CPT | Performed by: INTERNAL MEDICINE

## 2023-06-09 PROCEDURE — 99999 PR PBB SHADOW E&M-EST. PATIENT-LVL IV: ICD-10-PCS | Mod: PBBFAC,,, | Performed by: INTERNAL MEDICINE

## 2023-06-09 PROCEDURE — 81003 URINALYSIS AUTO W/O SCOPE: CPT | Performed by: INTERNAL MEDICINE

## 2023-06-09 PROCEDURE — 99499 UNLISTED E&M SERVICE: CPT | Mod: S$GLB,,, | Performed by: INTERNAL MEDICINE

## 2023-06-09 PROCEDURE — 84153 ASSAY OF PSA TOTAL: CPT | Performed by: INTERNAL MEDICINE

## 2023-06-09 PROCEDURE — 83735 ASSAY OF MAGNESIUM: CPT | Performed by: INTERNAL MEDICINE

## 2023-06-09 PROCEDURE — 99499 NO LOS: ICD-10-PCS | Mod: S$GLB,,, | Performed by: INTERNAL MEDICINE

## 2023-06-09 PROCEDURE — 80053 COMPREHEN METABOLIC PANEL: CPT | Performed by: INTERNAL MEDICINE

## 2023-06-09 NOTE — PROGRESS NOTES
42-year-old gentleman with a history of rhinitis (on Claritin daily and Flonase daily depending on the season) here for physical exam as well as questions regarding aging and testosterone    Pertinent review of systems:  Encounter to establish in for physical exam:  The patient has had no physical exam in the last 5 years.  The exam was made because a friend was talking about fatigue and was found to have low testosterone.  The patient pursues and lives a healthy lifestyle however it is filled with multiple stresses.  Patient works as an ICU nurse in the cardiac unit and typically will not only work his shift but other shifts as well.  He has 4 children any plays in coaches sports.  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  He notes this has created some financial stress.  In general he follows a good diet rotating proteins in carbohydrates.  He exercises on a regular near daily basis.  Recently he has had issues with sleep but he also notes he consumes a large amount of caffeine.    Questions about testosterone:  As noted above the patient at spoke to 1 of his friends and was wondering he had low testosterone.  He notes no changes in his voice, here distribution beard, muscle mass, but does note some nocturia.  He does not have splitting of his stream or change in his stream.  He has no symptoms of urinary retention or hesitancy.  He does not note significant frequency during the day.  Does note occasional dribbling.  He has no issues with erections or sexual drive/libido.  He has taken testosterone supplements through [facility] a year prior and felt well on those and is wondering whether those would be helpful at this time.  There is no history or symptoms of depression.  He has no family history of prostate cancer.      Past medical history:  Medical:  Patient has had no medical hospitalizations.    Surgical:  Patient has had no elective surgical procedures.    Trauma:  The  patient fractured both wrists and his left ulna in a single accident at age 12.    Psych:  The patient has never been seen by psychiatrist or psychologist.    Prevention:  Vaccines are up-to-date including COVID, flu, Tdap.  The patient has had his hepatitis vaccine but he had a recent needle stick and he was told to get a hepatitis-B booster.  In addition at that time an HIV test and hepatitis C test were done and were negative.    Family history:  Alcoholism:  Father   Coronary artery disease:  Paternal grandfather and mother.    Diabetes mellitus:  Maternal grandmother    Social history:  Tobacco:  The patient quit at age 33.  He has a 15 pack year history of smoking.    Alcohol:  The patient consumes 3-8 beers on the weekend and has an occasional hard liquor.  The amount he drinks will vary depending on the event he is at or during football season.  He does not usually drink every week at.  Bowel:  The patient moves his bowels daily   Diet:  The patient has multiple meals (grazes).  He has 5 cups of coffee a day plus caffeinated energy drinks p.r.n..  Sleep:  Patient sleeps 48 hours.  He has nocturia 1-3 times.  Exercise:  Patient exercises 4-5 times per week.    Social circumstances:  The patient is  with 4 children ages 4-16 and has 2 boys and 2 girls.  The oldest is 16 years old and is a woman the next is avoid followed by another girl and the youngest is avoid.  Patient notes he enjoys playing and watching sports.  He also dry social events such as playing cards and shooting rule.  Patient is in ICU nurse in the cardiac unit at [employer].  He is hoping to go back to school to become a nurse anesthetist.    Allergy: Patient has no known drug allergies but does have environmental allergies.      Medications:  Patient takes only loratadine daily.  He uses fluticasone during certain seasons of the year.    Review of systems:  The patient wears glasses and contacts.  He has been noted to snore by his wife.   He has a lipoma in his right mid back.  And he has a synovial cyst on his right hand.    Physical Exam  Vitals reviewed.   Constitutional:       General: He is not in acute distress.     Appearance: He is normal weight. He is not ill-appearing.   HENT:      Head: Normocephalic.      Right Ear: Tympanic membrane, ear canal and external ear normal.      Left Ear: Tympanic membrane, ear canal and external ear normal.      Nose: Nose normal. No congestion or rhinorrhea.      Mouth/Throat:      Mouth: Mucous membranes are moist.      Pharynx: Oropharynx is clear. No posterior oropharyngeal erythema.   Eyes:      General: No scleral icterus.     Extraocular Movements: Extraocular movements intact.      Conjunctiva/sclera: Conjunctivae normal.      Pupils: Pupils are equal, round, and reactive to light.      Comments: Fundi benign without exudate hemorrhage or scar   Neck:      Vascular: No carotid bruit.      Comments: Thyroid without enlargement, nodule, or bruit  Cardiovascular:      Rate and Rhythm: Normal rate and regular rhythm.      Pulses: Normal pulses.      Heart sounds: Normal heart sounds. No murmur heard.    No gallop.   Pulmonary:      Effort: Pulmonary effort is normal. No respiratory distress.      Breath sounds: Normal breath sounds. No wheezing, rhonchi or rales.   Abdominal:      General: Bowel sounds are normal. There is no distension.      Palpations: Abdomen is soft. There is no mass.      Tenderness: There is no abdominal tenderness. There is no guarding or rebound.      Hernia: No hernia is present.   Genitourinary:     Penis: Normal.       Testes: Normal.      Prostate: Normal.      Rectum: Normal. Guaiac result negative.   Musculoskeletal:         General: No swelling, tenderness or signs of injury. Normal range of motion.      Cervical back: Neck supple. No tenderness.      Right lower leg: No edema.      Left lower leg: No edema.      Comments: Patient has a synovial cyst or Bible cyst on his  right hand.   Lymphadenopathy:      Cervical: No cervical adenopathy.   Skin:     General: Skin is warm.      Coloration: Skin is not jaundiced.      Findings: No bruising, erythema or rash.      Comments: Patient has a lipoma as his right midback at the CVA area.   Neurological:      General: No focal deficit present.      Mental Status: He is alert and oriented to person, place, and time.      Cranial Nerves: No cranial nerve deficit.      Gait: Gait normal.      Deep Tendon Reflexes: Reflexes normal.   Psychiatric:         Mood and Affect: Mood normal.         Behavior: Behavior normal.     Assessment/plan   Encounter for physical exam:  Patient has a normal physical exam including his prostate.  His fatigue could not be easily explained by his schedule and a life filled with multiple activities.  In addition mild financial stress causes increased working hours though he enjoys his work.  Additionally he would like to go back to school on this is likely to create some additional stress.  In terms of lifestyle he enjoys a healthy lifestyle following a healthy diet and exercise.  Though he states that he may not be getting enough sleep his sleep is adequate any is able to go back to sleep if he voids.  The patient has a strong interest in his health reading about various supplements.  Plan:  Reviewed above with the patient   Routine lab   I will contact the patient with his test results and told the patient my phone number most likely as no caller ID.  The patient will return in 1 year for a physical and p.r.n. care.  He may return sooner as issues arise.    Concern about low testosterone:  The patient has no findings of low testosterone.  His prostate was normal and therefore his nocturia is most likely due to caffeine.  I reviewed his testosterone supplement program and suspect he will get little benefit from it.  I also explained why there was no benefit the doing testosterone testing.  Plan:  Reviewed above  with the patient.    Suggested a randomized controlled trial of 1 using this supplement for 30 days and then 30 days off in comparing.  I explained that he must keep all other factors such as diet and exercise the same.  He should also add no other supplements during this period.    Lipoma right CVA area:  Plan:  Observe     Synovial or Bible cyst right hand:  Plan:  Observe

## 2023-06-09 NOTE — PATIENT INSTRUCTIONS
Thank you for coming to visit today.  Your physical exam is entirely normal.  I would suggest you that the nocturia is related to caffeine.  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  I have ordered routine labs and I will call you with those results.  Please be advised it usually my phone number comes up as no caller ID.  I will call in the evening or on weekends.

## 2023-06-10 DIAGNOSIS — R73.9 ELEVATED BLOOD SUGAR: Primary | ICD-10-CM

## 2023-06-10 NOTE — PROGRESS NOTES
Called patient to review lab.  There were several clinically not significant abnormalities however his blood sugar was a.  No hemoglobin A1c was done.  I left a message that I would order that hemoglobin A1c and he can get that done at his convenience.  He need not be fasting.

## 2023-10-10 RX ORDER — SCOLOPAMINE TRANSDERMAL SYSTEM 1 MG/1
1 PATCH, EXTENDED RELEASE TRANSDERMAL
Qty: 2 PATCH | Refills: 3 | Status: SHIPPED | OUTPATIENT
Start: 2023-10-10

## 2023-10-10 RX ORDER — ONDANSETRON 4 MG/1
4 TABLET, ORALLY DISINTEGRATING ORAL 2 TIMES DAILY
Qty: 30 TABLET | Refills: 3 | Status: SHIPPED | OUTPATIENT
Start: 2023-10-10

## 2024-03-19 ENCOUNTER — OFFICE VISIT (OUTPATIENT)
Dept: SPORTS MEDICINE | Facility: CLINIC | Age: 44
End: 2024-03-19
Payer: COMMERCIAL

## 2024-03-19 ENCOUNTER — HOSPITAL ENCOUNTER (OUTPATIENT)
Dept: RADIOLOGY | Facility: HOSPITAL | Age: 44
Discharge: HOME | End: 2024-03-19
Attending: ORTHOPAEDIC SURGERY
Payer: COMMERCIAL

## 2024-03-19 VITALS
HEIGHT: 73 IN | DIASTOLIC BLOOD PRESSURE: 72 MMHG | WEIGHT: 201.75 LBS | BODY MASS INDEX: 26.74 KG/M2 | SYSTOLIC BLOOD PRESSURE: 128 MMHG | HEART RATE: 59 BPM

## 2024-03-19 DIAGNOSIS — M25.551 RIGHT HIP PAIN: Primary | ICD-10-CM

## 2024-03-19 DIAGNOSIS — M25.551 RIGHT HIP PAIN: ICD-10-CM

## 2024-03-19 PROCEDURE — 3078F DIAST BP <80 MM HG: CPT | Mod: CPTII,S$GLB,, | Performed by: ORTHOPAEDIC SURGERY

## 2024-03-19 PROCEDURE — 73502 X-RAY EXAM HIP UNI 2-3 VIEWS: CPT | Mod: 26,RT,, | Performed by: RADIOLOGY

## 2024-03-19 PROCEDURE — 3074F SYST BP LT 130 MM HG: CPT | Mod: CPTII,S$GLB,, | Performed by: ORTHOPAEDIC SURGERY

## 2024-03-19 PROCEDURE — 1159F MED LIST DOCD IN RCRD: CPT | Mod: CPTII,S$GLB,, | Performed by: ORTHOPAEDIC SURGERY

## 2024-03-19 PROCEDURE — 99204 OFFICE O/P NEW MOD 45 MIN: CPT | Mod: S$GLB,,, | Performed by: ORTHOPAEDIC SURGERY

## 2024-03-19 PROCEDURE — 3008F BODY MASS INDEX DOCD: CPT | Mod: CPTII,S$GLB,, | Performed by: ORTHOPAEDIC SURGERY

## 2024-03-19 PROCEDURE — 99999 PR PBB SHADOW E&M-EST. PATIENT-LVL III: CPT | Mod: PBBFAC,,, | Performed by: ORTHOPAEDIC SURGERY

## 2024-03-19 PROCEDURE — 73502 X-RAY EXAM HIP UNI 2-3 VIEWS: CPT | Mod: TC,RT

## 2024-03-19 PROCEDURE — 1160F RVW MEDS BY RX/DR IN RCRD: CPT | Mod: CPTII,S$GLB,, | Performed by: ORTHOPAEDIC SURGERY

## 2024-03-19 RX ORDER — IBUPROFEN 800 MG/1
800 TABLET ORAL 3 TIMES DAILY
COMMUNITY

## 2024-03-19 NOTE — PROGRESS NOTES
Subjective:     Chief Complaint: Nick Oconnell is a 43 y.o. male who had concerns including Pain of the Right Hip.    HPI    Patient who works as a ICU nurse presents to clinic with acute right leg pain x 4 weeks. Patient states he was playing basketball when he he was hit mid air and landed directly on lateral aspect of his hip.  This was followed by pain and swelling that improved, however he re-injured it 2 weeks later with another fall.  Last Friday, he was playing beach volleyball when he landed awkwardly on the right side and felt sudden pain along the anterior aspect of the hip and thigh.  This was followed by bruising and pain, which has not improved with RICE management. He rates the pain as 5/10 today.  He has attempted multiple conservative measures that include activity modification, ice & elevation, and Naproxen. He denies any mechanical symptoms to include locking and catching or instability.  Is affecting ADLs and limiting desired level of activity. Denies numbness, tingling, radiation, and inability to bear weight. He is here today to discuss treatment options.    No previous surgeries or trauma on bilateral hips      Review of Systems   Constitutional: Negative.   HENT: Negative.     Eyes: Negative.    Cardiovascular: Negative.    Respiratory: Negative.     Endocrine: Negative.    Hematologic/Lymphatic: Negative.    Skin:  Positive for color change (Bruising).   Musculoskeletal:  Positive for falls and myalgias. Negative for joint pain, joint swelling and stiffness.   Neurological: Negative.    Psychiatric/Behavioral: Negative.     Allergic/Immunologic: Negative.        Pain Related Questions  Over the past 3 days, what was your average pain during activity? (I.e. running, jogging, walking, climbing stairs, getting dressed, ect.): 7  Over the past 3 days, what was your highest pain level?: 8  Over the past 3 days, what was your lowest pain level? : 6    Other  How many nights a week are you  awakened by your affected body part?: 7  Was the patient's HEIGHT measured or patient reported?: Patient Reported  Was the patient's WEIGHT measured or patient reported?: Measured    Objective:     General: Nick is well-developed, well-nourished, appears stated age, in no acute distress, alert and oriented to time, place and person.     General    Nursing note and vitals reviewed.  Constitutional: He is oriented to person, place, and time. He appears well-developed and well-nourished. No distress.   HENT:   Head: Normocephalic and atraumatic.   Nose: Nose normal.   Eyes: EOM are normal.   Cardiovascular:  Intact distal pulses.            Pulmonary/Chest: Effort normal. No respiratory distress.   Neurological: He is alert and oriented to person, place, and time.   Psychiatric: He has a normal mood and affect. His behavior is normal. Judgment and thought content normal.           Right Knee Exam     Inspection   Alignment:  normal  Effusion: absent    Other   Muscle Tightness: hamstring tightness    Left Knee Exam     Inspection   Alignment:  normal  Effusion: absent    Other   Muscle Tightness: hamstring tightness    Right Hip Exam     Inspection   Scars: absent  Swelling: absent  Bruising: absent  No deformity of hip.  Quadriceps Atrophy:  Negative  Erythema: absent    Range of Motion   Abduction:  45   Adduction:  30   Extension:  20   Flexion:  120   External rotation:  80   Internal rotation:  30     Tests   Pain w/ forced internal rotation (LAUREANO): absent  Pain w/ forced external rotation (FADIR): present  Xin: negative  Trendelenburg Test: negative  Circumduction test: negative  Stinchfield test: positive  Log Roll: negative    Other   Sensation: normal  Left Hip Exam     Inspection   Scars: absent  Swelling: absent  No deformity of hip.  Quadriceps Atrophy:  negative  Erythema: absent  Bruising: absent    Range of Motion   Abduction:  45 normal   Adduction:  30 normal   Extension:  20 normal   Flexion:   120 normal   External rotation:  70 normal   Internal rotation: 30 normal     Tests   Pain w/ forced internal rotation (LAUREANO): absent  Pain w/ forced external rotation (FADIR): absent  Xin: negative  Trendelenburg Test: negative  Circumduction test: negative  Stinchfield test: negative  Log Roll: negative    Other   Sensation: normal          Muscle Strength   Right Lower Extremity   Hip Abduction: 5/5   Hip Adduction: 5/5   Hip Flexion: 4/5   Ankle Dorsiflexion:  5/5   Left Lower Extremity   Hip Abduction: 5/5   Hip Adduction: 5/5   Hip Flexion: 5/5   Ankle Dorsiflexion:  5/5     Vascular Exam     Right Pulses  Dorsalis Pedis:      2+  Posterior Tibial:      2+        Left Pulses  Dorsalis Pedis:      2+  Posterior Tibial:      2+        Capillary Refill  Right Hand: normal capillary refill  Left Hand: normal capillary refill        Edema  Right Upper Leg: absent  Left Upper Leg: absent    Radiographs right hip     My interpretation:    Mild DJD changes with cam deformity noted    No signs of fracture, severe DJD, or any other bony abnormality seen      Assessment:     Encounter Diagnosis   Name Primary?    Right hip pain Yes        Plan:     1. I made the decision to order new imaging of the extremity or extremities evaluated. I independently reviewed and interpreted the radiographs and/or MRIs today. These images were shown to the patient where I then discussed my findings in detail.    2. We discussed at length different treatment options including conservative vs surgical management. These include anti-inflammatories, acetaminophen, rest, ice, heat, formal physical therapy including strengthening and stretching exercises, home exercise programs, dry needling, and finally surgical intervention.  We discussed multiple causes of his pain to include bone and muscle contusion on his lateral side followed by sprain of the sartorius with most recent injury.  We discussed multiple treatment options moving forward to  include formal physical therapy versus obtaining an MRI to rule out any significant injury.  Patient states he would like to continue RICE management for 1 more week to see if pain resolves.  I think this is reasonable.    3. Ice compress to the affected area 2-3x a day for 15-20 minutes as needed for pain management.  We discussed a prescription strength anti-inflammatory, muscle relaxant, or a Medrol Dosepak to help with the pain.  Patient declined these at this time and states he would like to continue taking ibuprofen.    4. RTC to see Jasen gutierrez. Patient will contact our clinic in a week to let us know if pain is not resolved and he would like to obtain MRI versus formal physical therapy.      All of the patient's questions were answered. Patient was advised to call the clinic or contact me through the patient portal for any questions or concerns.       Medical Dictation software was used during the dictation of portions or the entirety of this medical record.  Phonetic or grammatic errors may exist due to the use of this software. For clarification, refer to the author of the document.        Patient questionnaires may have been collected.

## 2024-12-02 ENCOUNTER — HOSPITAL ENCOUNTER (OUTPATIENT)
Dept: RADIOLOGY | Facility: HOSPITAL | Age: 44
Discharge: HOME | End: 2024-12-02
Attending: NURSE PRACTITIONER
Payer: COMMERCIAL
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
## 2024-12-02 DIAGNOSIS — S62.102A CLOSED FRACTURE OF LEFT WRIST, INITIAL ENCOUNTER: Primary | ICD-10-CM
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
## 2024-12-02 PROCEDURE — 73130 X-RAY EXAM OF HAND: CPT | Mod: TC,LT

## 2024-12-02 PROCEDURE — 73130 X-RAY EXAM OF HAND: CPT | Mod: 26,LT,, | Performed by: RADIOLOGY

## 2025-02-04 ENCOUNTER — OFFICE VISIT (OUTPATIENT)
Dept: OPTOMETRY | Facility: CLINIC | Age: 45
End: 2025-02-04
Payer: COMMERCIAL

## 2025-02-04 DIAGNOSIS — H52.203 MYOPIA OF BOTH EYES WITH ASTIGMATISM: Primary | ICD-10-CM

## 2025-02-04 DIAGNOSIS — Z01.00 EXAMINATION OF EYES AND VISION: ICD-10-CM

## 2025-02-04 DIAGNOSIS — H52.13 MYOPIA OF BOTH EYES WITH ASTIGMATISM: Primary | ICD-10-CM

## 2025-02-04 DIAGNOSIS — Z46.0 ENCOUNTER FOR FITTING OR ADJUSTMENT OF SPECTACLES OR CONTACT LENSES: ICD-10-CM

## 2025-02-04 DIAGNOSIS — Z13.5 SCREENING FOR EYE CONDITION: ICD-10-CM

## 2025-02-04 PROCEDURE — 92015 DETERMINE REFRACTIVE STATE: CPT | Mod: S$GLB,,, | Performed by: OPTOMETRIST

## 2025-02-04 PROCEDURE — 99999 PR PBB SHADOW E&M-EST. PATIENT-LVL III: CPT | Mod: PBBFAC,,, | Performed by: OPTOMETRIST

## 2025-02-04 PROCEDURE — 92014 COMPRE OPH EXAM EST PT 1/>: CPT | Mod: S$GLB,,, | Performed by: OPTOMETRIST

## 2025-02-04 NOTE — PROGRESS NOTES
HPI    44 Year old male is present today for annual eye  exam and CL renewal. Pt   states he is happy with glasses and Cl. He wears CL most of the time.  Pt   is almost out of CL     -Flashes   -Floaters   -pain  +night vision is not the greatest   -No gtts       Last edited by Marisa Trevino on 2/4/2025  8:50 AM.            Assessment /Plan     For exam results, see Encounter Report.    Myopia of both eyes with astigmatism    Examination of eyes and vision    Encounter for fitting or adjustment of spectacles or contact lenses    Screening for eye condition        MONITOR. ED PT ON ALL EXAM FINDINGS  RX FINAL SPECS. TRIAL CLS. PREVIOUSLY DID ONE CL DIFFERENT BRAND PER EYE; COMPARE SAME BRANDS OD, OS (ORDER RX 2 AND 3); OK TO CALL TO FINALIZE, MODIFY OR STAY W/ HABITUAL CL  OCULAR HEALTH WNL OD, OS   RTC 1 YR//PRN FOR REE/DFE

## 2025-02-25 ENCOUNTER — CLINICAL SUPPORT (OUTPATIENT)
Dept: OTHER | Facility: CLINIC | Age: 45
End: 2025-02-25

## 2025-02-25 DIAGNOSIS — Z00.8 ENCOUNTER FOR OTHER GENERAL EXAMINATION: ICD-10-CM

## 2025-02-26 VITALS
HEIGHT: 72 IN | SYSTOLIC BLOOD PRESSURE: 142 MMHG | DIASTOLIC BLOOD PRESSURE: 75 MMHG | WEIGHT: 198 LBS | BODY MASS INDEX: 26.82 KG/M2

## 2025-02-26 LAB
GLUCOSE SERPL-MCNC: 91 MG/DL (ref 60–140)
HDLC SERPL-MCNC: 42 MG/DL
POC CHOLESTEROL, LDL (DOCK): 99 MG/DL
POC CHOLESTEROL, TOTAL: 159 MG/DL
TRIGL SERPL-MCNC: 94 MG/DL

## 2025-04-28 ENCOUNTER — PATIENT MESSAGE (OUTPATIENT)
Dept: OPTOMETRY | Facility: CLINIC | Age: 45
End: 2025-04-28
Payer: COMMERCIAL

## 2025-05-01 ENCOUNTER — PATIENT MESSAGE (OUTPATIENT)
Dept: OPTOMETRY | Facility: CLINIC | Age: 45
End: 2025-05-01
Payer: COMMERCIAL

## 2025-08-11 ENCOUNTER — HOSPITAL ENCOUNTER (OUTPATIENT)
Dept: CARDIOLOGY | Facility: HOSPITAL | Age: 45
Discharge: HOME | End: 2025-08-11
Attending: INTERNAL MEDICINE
Payer: COMMERCIAL

## 2025-08-11 ENCOUNTER — OFFICE VISIT (OUTPATIENT)
Dept: CARDIOLOGY | Facility: CLINIC | Age: 45
End: 2025-08-11
Payer: COMMERCIAL

## 2025-08-11 VITALS
HEIGHT: 73 IN | BODY MASS INDEX: 25.84 KG/M2 | WEIGHT: 195 LBS | DIASTOLIC BLOOD PRESSURE: 55 MMHG | HEART RATE: 80 BPM | OXYGEN SATURATION: 98 % | WEIGHT: 198 LBS | BODY MASS INDEX: 26.24 KG/M2 | SYSTOLIC BLOOD PRESSURE: 133 MMHG | HEIGHT: 73 IN

## 2025-08-11 DIAGNOSIS — R07.9 CHEST PAIN, UNSPECIFIED TYPE: ICD-10-CM

## 2025-08-11 DIAGNOSIS — R07.2 PRECORDIAL PAIN: Primary | ICD-10-CM

## 2025-08-11 DIAGNOSIS — R07.9 CHEST PAIN, UNSPECIFIED TYPE: Primary | ICD-10-CM

## 2025-08-11 LAB
AORTIC SIZE INDEX (SOV): 1.5 CM/M2
AORTIC SIZE INDEX: 1.5 CM/M2
ASCENDING AORTA: 3.1 CM
AV AREA BY CONTINUOUS VTI: 4.5 CM2
AV INDEX (PROSTH): 0.92
AV LVOT MEAN GRADIENT: 3 MMHG
AV LVOT PEAK GRADIENT: 7 MMHG
AV MEAN GRADIENT: 4 MMHG
AV PEAK GRADIENT: 8 MMHG
AV VALVE AREA BY VELOCITY RATIO: 4.6 CM²
AV VALVE AREA: 4.5 CM2
AV VELOCITY RATIO: 0.93
BSA FOR ECHO PROCEDURE: 2.13 M2
CV ECHO LV RWT: 0.36 CM
CV STRESS BASE HR: 58 BPM
DIASTOLIC BLOOD PRESSURE: 79 MMHG
DOP CALC AO PEAK VEL: 1.4 M/S
DOP CALC AO VTI: 26.6 CM
DOP CALC LVOT AREA: 4.9 CM2
DOP CALC LVOT DIAMETER: 2.5 CM
DOP CALC LVOT PEAK VEL: 1.3 M/S
DOP CALCLVOT PEAK VEL VTI: 24.6 CM
E WAVE DECELERATION TIME: 265 MS
E/A RATIO: 2.42
E/E' RATIO: 6 M/S
ECHO EF ESTIMATED: 65 %
ECHO LV POSTERIOR WALL: 1 CM (ref 0.6–1.1)
FRACTIONAL SHORTENING: 34.5 % (ref 28–44)
INTERVENTRICULAR SEPTUM: 0.8 CM (ref 0.6–1.1)
IVC DIAMETER: 1.59 CM
IVRT: 86 MS
LA MAJOR: 5.9 CM
LA MINOR: 5.9 CM
LA WIDTH: 4.4 CM
LEFT ATRIUM SIZE: 3.2 CM
LEFT ATRIUM VOLUME INDEX: 33 ML/M2
LEFT ATRIUM VOLUME: 71 CM3
LEFT INTERNAL DIMENSION IN SYSTOLE: 3.6 CM (ref 2.1–4)
LEFT VENTRICLE DIASTOLIC VOLUME INDEX: 70.42 ML/M2
LEFT VENTRICLE DIASTOLIC VOLUME: 150 ML
LEFT VENTRICLE MASS INDEX: 87.2 G/M2
LEFT VENTRICLE SYSTOLIC VOLUME INDEX: 24.9 ML/M2
LEFT VENTRICLE SYSTOLIC VOLUME: 53 ML
LEFT VENTRICULAR INTERNAL DIMENSION IN DIASTOLE: 5.5 CM (ref 3.5–6)
LEFT VENTRICULAR MASS: 185.8 G
LV LATERAL E/E' RATIO: 4.8
LV SEPTAL E/E' RATIO: 8.7
Lab: 1.7 CM/M
Lab: 1.8 CM/M
MV A" WAVE DURATION": 102.76 MS
MV PEAK A VEL: 0.36 M/S
MV PEAK E VEL: 0.87 M/S
OHS CV CPX 1 MINUTE RECOVERY HEART RATE: 131 BPM
OHS CV CPX 85 PERCENT MAX PREDICTED HEART RATE MALE: 150
OHS CV CPX ESTIMATED METS: 18
OHS CV CPX MAX PREDICTED HEART RATE: 176
OHS CV CPX PATIENT HEIGHT IN: 73
OHS CV CPX PATIENT IS FEMALE: 0
OHS CV CPX PATIENT IS MALE: 1
OHS CV CPX PEAK DIASTOLIC BLOOD PRESSURE: 94 MMHG
OHS CV CPX PEAK HEAR RATE: 169 BPM
OHS CV CPX PEAK RATE PRESSURE PRODUCT: NORMAL
OHS CV CPX PEAK SYSTOLIC BLOOD PRESSURE: 205 MMHG
OHS CV CPX PERCENT MAX PREDICTED HEART RATE ACHIEVED: 96
OHS CV CPX RATE PRESSURE PRODUCT PRESENTING: 8004
OHS CV RV/LV RATIO: 0.64 CM
PISA TR MAX VEL: 2.5 M/S
PULM VEIN A" WAVE DURATION": 102.76 MS
PULM VEIN S/D RATIO: 0.83
PULMONIC VEIN PEAK A VELOCITY: 0.3 M/S
PV PEAK D VEL: 0.42 M/S
PV PEAK S VEL: 0.35 M/S
RA MAJOR: 5.9 CM
RA PRESSURE ESTIMATED: 3 MMHG
RA WIDTH: 3.96 CM
RIGHT VENTRICLE DIASTOLIC BASEL DIMENSION: 3.5 CM
RV TB RVSP: 6 MMHG
RV TISSUE DOPPLER FREE WALL SYSTOLIC VELOCITY 1 (APICAL 4 CHAMBER VIEW): 16.18 CM/S
SINUS: 3.3 CM
STJ: 3.2 CM
STRESS ECHO POST EXERCISE DUR MIN: 10 MINUTES
STRESS ECHO POST EXERCISE DUR SEC: 15 SECONDS
SYSTOLIC BLOOD PRESSURE: 138 MMHG
TDI LATERAL: 0.18 M/S
TDI SEPTAL: 0.1 M/S
TDI: 0.14 M/S
TRICUSPID ANNULAR PLANE SYSTOLIC EXCURSION: 3 CM
TV PEAK GRADIENT: 25 MMHG
TV REST PULMONARY ARTERY PRESSURE: 28 MMHG
Z-SCORE OF LEFT VENTRICULAR DIMENSION IN END DIASTOLE: -2.11
Z-SCORE OF LEFT VENTRICULAR DIMENSION IN END SYSTOLE: -1.11

## 2025-08-11 PROCEDURE — 93325 DOPPLER ECHO COLOR FLOW MAPG: CPT | Mod: 26,,, | Performed by: INTERNAL MEDICINE

## 2025-08-11 PROCEDURE — 93351 STRESS TTE COMPLETE: CPT | Mod: 26,,, | Performed by: INTERNAL MEDICINE

## 2025-08-11 PROCEDURE — 93351 STRESS TTE COMPLETE: CPT

## 2025-08-11 PROCEDURE — 99999 PR PBB SHADOW E&M-EST. PATIENT-LVL IV: CPT | Mod: PBBFAC,,, | Performed by: INTERNAL MEDICINE

## 2025-08-11 PROCEDURE — 93320 DOPPLER ECHO COMPLETE: CPT | Mod: 26,,, | Performed by: INTERNAL MEDICINE

## 2025-08-11 PROCEDURE — 99499 UNLISTED E&M SERVICE: CPT | Mod: S$GLB,,, | Performed by: INTERNAL MEDICINE

## 2025-08-20 ENCOUNTER — OFFICE VISIT (OUTPATIENT)
Dept: PRIMARY CARE CLINIC | Facility: CLINIC | Age: 45
End: 2025-08-20
Payer: COMMERCIAL

## 2025-08-20 ENCOUNTER — LAB VISIT (OUTPATIENT)
Dept: LAB | Facility: HOSPITAL | Age: 45
End: 2025-08-20
Payer: COMMERCIAL

## 2025-08-20 VITALS
WEIGHT: 196.56 LBS | DIASTOLIC BLOOD PRESSURE: 78 MMHG | TEMPERATURE: 98 F | HEIGHT: 73 IN | HEART RATE: 75 BPM | BODY MASS INDEX: 26.05 KG/M2 | SYSTOLIC BLOOD PRESSURE: 122 MMHG | OXYGEN SATURATION: 97 %

## 2025-08-20 DIAGNOSIS — Z00.00 ANNUAL PHYSICAL EXAM: Primary | ICD-10-CM

## 2025-08-20 DIAGNOSIS — Z00.00 ANNUAL PHYSICAL EXAM: ICD-10-CM

## 2025-08-20 DIAGNOSIS — R07.9 CHEST PAIN, UNSPECIFIED TYPE: ICD-10-CM

## 2025-08-20 LAB
ABSOLUTE EOSINOPHIL (OHS): 0.15 K/UL
ABSOLUTE MONOCYTE (OHS): 0.64 K/UL (ref 0.3–1)
ABSOLUTE NEUTROPHIL COUNT (OHS): 5.99 K/UL (ref 1.8–7.7)
ALBUMIN SERPL BCP-MCNC: 4.6 G/DL (ref 3.5–5.2)
ALP SERPL-CCNC: 72 UNIT/L (ref 40–150)
ALT SERPL W/O P-5'-P-CCNC: 27 UNIT/L (ref 0–55)
ANION GAP (OHS): 8 MMOL/L (ref 8–16)
AST SERPL-CCNC: 21 UNIT/L (ref 0–50)
BASOPHILS # BLD AUTO: 0.04 K/UL
BASOPHILS NFR BLD AUTO: 0.5 %
BILIRUB SERPL-MCNC: 0.6 MG/DL (ref 0.1–1)
BUN SERPL-MCNC: 26 MG/DL (ref 6–20)
CALCIUM SERPL-MCNC: 9.2 MG/DL (ref 8.7–10.5)
CHLORIDE SERPL-SCNC: 101 MMOL/L (ref 95–110)
CHOLEST SERPL-MCNC: 179 MG/DL (ref 120–199)
CHOLEST/HDLC SERPL: 3.8 {RATIO} (ref 2–5)
CO2 SERPL-SCNC: 27 MMOL/L (ref 23–29)
CREAT SERPL-MCNC: 1.1 MG/DL (ref 0.5–1.4)
EAG (OHS): 97 MG/DL (ref 68–131)
ERYTHROCYTE [DISTWIDTH] IN BLOOD BY AUTOMATED COUNT: 11.9 % (ref 11.5–14.5)
GFR SERPLBLD CREATININE-BSD FMLA CKD-EPI: >60 ML/MIN/1.73/M2
GLUCOSE SERPL-MCNC: 89 MG/DL (ref 70–110)
HBA1C MFR BLD: 5 % (ref 4–5.6)
HBV SURFACE AB SER-ACNC: <3 MIU/ML
HBV SURFACE AB SERPL IA-ACNC: NORMAL M[IU]/ML
HCT VFR BLD AUTO: 42.6 % (ref 40–54)
HCV AB SERPL QL IA: NORMAL
HDLC SERPL-MCNC: 47 MG/DL (ref 40–75)
HDLC SERPL: 26.3 % (ref 20–50)
HGB BLD-MCNC: 14.8 GM/DL (ref 14–18)
HIV 1+2 AB+HIV1 P24 AG SERPL QL IA: NORMAL
IMM GRANULOCYTES # BLD AUTO: 0.03 K/UL (ref 0–0.04)
IMM GRANULOCYTES NFR BLD AUTO: 0.4 % (ref 0–0.5)
LDLC SERPL CALC-MCNC: 104.2 MG/DL (ref 63–159)
LYMPHOCYTES # BLD AUTO: 1.56 K/UL (ref 1–4.8)
MAGNESIUM SERPL-MCNC: 2 MG/DL (ref 1.6–2.6)
MCH RBC QN AUTO: 30.2 PG (ref 27–31)
MCHC RBC AUTO-ENTMCNC: 34.7 G/DL (ref 32–36)
MCV RBC AUTO: 87 FL (ref 82–98)
NONHDLC SERPL-MCNC: 132 MG/DL
NUCLEATED RBC (/100WBC) (OHS): 0 /100 WBC
PLATELET # BLD AUTO: 233 K/UL (ref 150–450)
PMV BLD AUTO: 11.3 FL (ref 9.2–12.9)
POTASSIUM SERPL-SCNC: 4.1 MMOL/L (ref 3.5–5.1)
PROT SERPL-MCNC: 7.5 GM/DL (ref 6–8.4)
RBC # BLD AUTO: 4.9 M/UL (ref 4.6–6.2)
RELATIVE EOSINOPHIL (OHS): 1.8 %
RELATIVE LYMPHOCYTE (OHS): 18.5 % (ref 18–48)
RELATIVE MONOCYTE (OHS): 7.6 % (ref 4–15)
RELATIVE NEUTROPHIL (OHS): 71.2 % (ref 38–73)
SODIUM SERPL-SCNC: 136 MMOL/L (ref 136–145)
T PALLIDUM IGG+IGM SER QL: NORMAL
TRIGL SERPL-MCNC: 139 MG/DL (ref 30–150)
WBC # BLD AUTO: 8.41 K/UL (ref 3.9–12.7)

## 2025-08-20 PROCEDURE — 83735 ASSAY OF MAGNESIUM: CPT

## 2025-08-20 PROCEDURE — 86706 HEP B SURFACE ANTIBODY: CPT

## 2025-08-20 PROCEDURE — 36415 COLL VENOUS BLD VENIPUNCTURE: CPT

## 2025-08-20 PROCEDURE — 80053 COMPREHEN METABOLIC PANEL: CPT

## 2025-08-20 PROCEDURE — 86593 SYPHILIS TEST NON-TREP QUANT: CPT

## 2025-08-20 PROCEDURE — 86803 HEPATITIS C AB TEST: CPT

## 2025-08-20 PROCEDURE — 99499 UNLISTED E&M SERVICE: CPT | Mod: S$GLB,,, | Performed by: INTERNAL MEDICINE

## 2025-08-20 PROCEDURE — 87389 HIV-1 AG W/HIV-1&-2 AB AG IA: CPT

## 2025-08-20 PROCEDURE — 85025 COMPLETE CBC W/AUTO DIFF WBC: CPT

## 2025-08-20 PROCEDURE — 83718 ASSAY OF LIPOPROTEIN: CPT

## 2025-08-20 PROCEDURE — 83036 HEMOGLOBIN GLYCOSYLATED A1C: CPT

## 2025-08-21 ENCOUNTER — TELEPHONE (OUTPATIENT)
Dept: PRIMARY CARE CLINIC | Facility: CLINIC | Age: 45
End: 2025-08-21
Payer: COMMERCIAL